# Patient Record
Sex: FEMALE | NOT HISPANIC OR LATINO | URBAN - METROPOLITAN AREA
[De-identification: names, ages, dates, MRNs, and addresses within clinical notes are randomized per-mention and may not be internally consistent; named-entity substitution may affect disease eponyms.]

---

## 2019-07-03 ENCOUNTER — IMPORTED ENCOUNTER (OUTPATIENT)
Dept: URBAN - METROPOLITAN AREA CLINIC 1 | Facility: CLINIC | Age: 54
End: 2019-07-03

## 2019-07-03 PROBLEM — H55.00: Noted: 2019-07-03

## 2019-07-03 PROBLEM — H40.023: Noted: 2019-07-03

## 2019-07-03 PROBLEM — H35.3121: Noted: 2019-07-03

## 2019-07-03 PROBLEM — H27.01: Noted: 2019-07-03

## 2019-07-03 PROBLEM — H40.053: Noted: 2019-07-03

## 2019-07-03 PROBLEM — H25.812: Noted: 2019-07-03

## 2019-07-03 PROCEDURE — 92133 CPTRZD OPH DX IMG PST SGM ON: CPT

## 2019-07-03 PROCEDURE — 92004 COMPRE OPH EXAM NEW PT 1/>: CPT

## 2019-07-03 PROCEDURE — 92015 DETERMINE REFRACTIVE STATE: CPT

## 2019-07-03 NOTE — PATIENT DISCUSSION
Nystagmus OU  -- the patient has rhythmic oscillations/tremors of both eyes that occur independently from normal eye movements.

## 2019-07-03 NOTE — PATIENT DISCUSSION
ARMD OS Early/dry/stable. Importance of daily AREDS II study multivitamin and Amsler Grid checks discussed with patient. Patient to follow-up immediately with any new onset of decreased vision and/or metamorphopsia.

## 2019-07-03 NOTE — PATIENT DISCUSSION
1.  Glaucoma Suspect OU/OHTN (CD 0.30 OU): OCT shows minimal thinning OU. DC Timolol QD OD. Begin Travatan Z QHS OU (sample x2 given). Patient is considered high risk. Condition was discussed with patient and patient understands. Will continue to monitor patient for any progression in condition. Patient was advised to call us with any problems questions or concerns. 2.  ARMD OS Early/dry/stable. Importance of daily AREDS II study multivitamin and Amsler Grid checks discussed with patient. Patient to follow-up immediately with any new onset of decreased vision and/or metamorphopsia. 3. Cataract OS: Observe for now without intervention. The patient was advised to contact us if any change or worsening of vision4. 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 Nystagmus OU  -- the patient has rhythmic oscillations/tremors of both eyes that occur independently from normal eye movements. MRX for glasses given. Return for an appointment in 3-4 weeks 10 (IOP check) with Dr. James Cabrera.

## 2019-08-02 ENCOUNTER — IMPORTED ENCOUNTER (OUTPATIENT)
Dept: URBAN - METROPOLITAN AREA CLINIC 1 | Facility: CLINIC | Age: 54
End: 2019-08-02

## 2019-08-02 PROBLEM — H40.023: Noted: 2019-08-02

## 2019-08-02 PROCEDURE — 92012 INTRM OPH EXAM EST PATIENT: CPT

## 2019-08-02 NOTE — PATIENT DISCUSSION
1.  Glaucoma Suspect OU/OHTN (CD 0.30 OU) IOP improved OU on Travatan. Cont Travatan Z QHS OU. Compliance with gtt. 2.  H/o ARMD OS Early/dry 3. Cataract OS: Observe  4. 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 Nystagmus OU- observe. Return for an appointment in  6 mo 10 dfe (no testing per PMG) with Dr. Barbara De La Torre.

## 2022-04-02 ASSESSMENT — TONOMETRY
OS_IOP_MMHG: 27
OD_IOP_MMHG: 27
OD_IOP_MMHG: 21
OS_IOP_MMHG: 21

## 2022-04-02 ASSESSMENT — VISUAL ACUITY
OS_CC: 20/25
OS_SC: 20/20